# Patient Record
Sex: FEMALE | Race: WHITE | NOT HISPANIC OR LATINO | Employment: STUDENT | URBAN - METROPOLITAN AREA
[De-identification: names, ages, dates, MRNs, and addresses within clinical notes are randomized per-mention and may not be internally consistent; named-entity substitution may affect disease eponyms.]

---

## 2017-11-29 ENCOUNTER — HOSPITAL ENCOUNTER (EMERGENCY)
Facility: CLINIC | Age: 27
Discharge: HOME | End: 2017-11-29
Attending: EMERGENCY MEDICINE | Admitting: EMERGENCY MEDICINE
Payer: COMMERCIAL

## 2017-11-29 VITALS
TEMPERATURE: 99.7 F | SYSTOLIC BLOOD PRESSURE: 120 MMHG | RESPIRATION RATE: 16 BRPM | BODY MASS INDEX: 32.39 KG/M2 | OXYGEN SATURATION: 99 % | WEIGHT: 165 LBS | DIASTOLIC BLOOD PRESSURE: 74 MMHG | HEART RATE: 65 BPM | HEIGHT: 60 IN

## 2017-11-29 DIAGNOSIS — T78.40XA ALLERGIC REACTION, INITIAL ENCOUNTER: ICD-10-CM

## 2017-11-29 PROCEDURE — 25000125 ZZHC RX 250: Performed by: EMERGENCY MEDICINE

## 2017-11-29 PROCEDURE — 25000128 H RX IP 250 OP 636: Performed by: EMERGENCY MEDICINE

## 2017-11-29 PROCEDURE — 96375 TX/PRO/DX INJ NEW DRUG ADDON: CPT

## 2017-11-29 PROCEDURE — 99284 EMERGENCY DEPT VISIT MOD MDM: CPT | Mod: 25

## 2017-11-29 PROCEDURE — 96374 THER/PROPH/DIAG INJ IV PUSH: CPT

## 2017-11-29 RX ORDER — DIPHENHYDRAMINE HYDROCHLORIDE 50 MG/ML
25 INJECTION INTRAMUSCULAR; INTRAVENOUS ONCE
Status: COMPLETED | OUTPATIENT
Start: 2017-11-29 | End: 2017-11-29

## 2017-11-29 RX ORDER — PREDNISONE 20 MG/1
40 TABLET ORAL ONCE
Status: COMPLETED | OUTPATIENT
Start: 2017-11-29 | End: 2017-11-29

## 2017-11-29 RX ADMIN — PREDNISONE 40 MG: 20 TABLET ORAL at 15:25

## 2017-11-29 RX ADMIN — DIPHENHYDRAMINE HYDROCHLORIDE 25 MG: 50 INJECTION, SOLUTION INTRAMUSCULAR; INTRAVENOUS at 15:22

## 2017-11-29 ASSESSMENT — ENCOUNTER SYMPTOMS
DIARRHEA: 0
CHEST TIGHTNESS: 1
VOMITING: 0
FACIAL SWELLING: 1
ABDOMINAL PAIN: 0
FEVER: 0
TROUBLE SWALLOWING: 1

## 2017-11-29 NOTE — ED AVS SNAPSHOT
[facility] Emergency Department    [address]    [facility] [address]    Phone:  [phone]    Fax:  [phone]                                       Jacoby Vasquez   MRN: [id number]    Department:  [facility] Emergency Department   Date of Visit:  11/29/2017           After Visit Summary Signature Page     I have received my discharge instructions, and my questions have been answered. I have discussed any challenges I see with this plan with the nurse or doctor.    ..........................................................................................................................................  Patient/Patient Representative Signature      ..........................................................................................................................................  Patient Representative Print Name and Relationship to Patient    ..................................................               ................................................  Date                                            Time    ..........................................................................................................................................  Reviewed by Signature/Title    ...................................................              ..............................................  Date                                                            Time

## 2017-11-29 NOTE — ED AVS SNAPSHOT
[facility] Emergency Department    [address]    [facility] [address]    Phone:  [phone]    Fax:  [phone]                                       Jacoby Vasquez   MRN: [id number]    Department:  [facility] Emergency Department   Date of Visit:  11/29/2017           Patient Information     Date Of Birth          1990        Your diagnoses for this visit were:     Allergic reaction, initial encounter        You were seen by Bryce Meier MD.      Follow-up Information     Follow up with [facility]. Schedule an appointment as soon as possible for a visit in 3 days.    Specialty:  Family Medicine    Why:  For follow up or see your own primary care physician.    Contact information:    [address] [facility] [address] [phone]        Discharge Instructions       Discharge Instructions  Allergic Reaction    An allergic reaction can result in a rash, itching, swelling, watery eyes, or a runny nose. A serious reaction can cause swelling of your mouth or throat, or difficulty breathing (wheezing). The most serious allergy is called anaphylaxis, and can be life-threatening. Many allergies result in hives, also called urticaria.       An allergy happens when the body s natural defense system (immune system) overreacts to something. The thing that triggers your allergic reaction is called an allergen. The first time you are exposed to your allergen, you may not have any reaction, but the body makes a protein called an antibody. The antibody lets the body recognize and remember the allergen.  Every time you are exposed to your allergen you get more antibody and your reaction can be more severe.      Generally, every Emergency Department visit should have a follow-up clinic visit with either a primary or a specialty clinic/provider. Please follow-up as instructed by your emergency provider today.    Call 911 if you have:    Swelling  of the lips, tongue or throat.    Hoarse voice, drooling or trouble breathing.    Chest pain or shortness of breath.    Fainting or unconsciousness.    What can I do to help myself?    If you know what caused your allergy, do not touch it, throw any of it away, and tell others not to have it around you. Wear a medical alert bracelet with a name of your allergen on it.    If you do not know what you are allergic to, keep a journal of everything that you are exposed to (foods, soaps, medicines, etc.). Take this with you when you follow up with your primary provider or specialist (Allergist). This may help determine what is causing the allergic reaction.    Take any medicines that are prescribed.    Antihistamines can decrease rash or itching. You may use Benadryl  (diphenhydramine) for rash or itching according to package directions, or use a prescription antihistamine as recommended by your provider.    For significant allergic reactions, you may have been given a prescription for an epinephrine (adrenaline) auto injector. Carry this with you at all times! Use it if you are having any symptoms of anaphylaxis.  Do not be afraid to use it. Return to the Emergency Department if you use your auto injector, call 911 if it does not resolve the symptoms. It is only meant to buy time until you can get to the Emergency Department!  If you were given a prescription for medicine here today, be sure to read all of the information (including the package insert) that comes with your prescription.  This will include important information about the medicine, its side effects, and any warnings that you need to know about.  The pharmacist who fills the prescription can provide more information and answer questions you may have about the medicine.  If you have questions or concerns that the pharmacist cannot address, please call or return to the Emergency Department.   Remember that you can always come back to the Emergency Department  if you are not able to see your regular provider in the amount of time listed above, if you get any new symptoms, or if there is anything that worries you.      24 Hour Appointment Hotline       To make an appointment at any [facility], call [phone] ([phone]). If you don't have a family doctor or clinic, we will help you find one. [facility] clinics are conveniently located to serve the needs of you and your family.             Review of your medicines      Notice     You have not been prescribed any medications.            Orders Needing Specimen Collection     None      Pending Results     No orders found from 11/27/2017 to 11/30/2017.            Pending Culture Results     No orders found from 11/27/2017 to 11/30/2017.            Pending Results Instructions     If you had any lab results that were not finalized at the time of your Discharge, you can call the ED Lab Result RN at [phone]. You will be contacted by this team for any positive Lab results or changes in treatment. The nurses are available 7 days a week from 10A to 6:30P.  You can leave a message 24 hours per day and they will return your call.        Test Results From Your Hospital Stay               Clinical Quality Measure: Blood Pressure Screening     Your blood pressure was checked while you were in the emergency department today. The last reading we obtained was  BP: 90/51 . Please read the guidelines below about what these numbers mean and what you should do about them.  If your systolic blood pressure (the top number) is less than 120 and your diastolic blood pressure (the bottom number) is less than 80, then your blood pressure is normal. There is nothing more that you need to do about it.  If your systolic blood pressure (the top number) is 120-139 or your diastolic blood pressure (the bottom number) is 80-89, your blood pressure may be higher than it should be. You should have your blood pressure rechecked within a year  "by a primary care provider.  If your systolic blood pressure (the top number) is 140 or greater or your diastolic blood pressure (the bottom number) is 90 or greater, you may have high blood pressure. High blood pressure is treatable, but if left untreated over time it can put you at risk for heart attack, stroke, or kidney failure. You should have your blood pressure rechecked by a primary care provider within the next 4 weeks.  If your provider in the emergency department today gave you specific instructions to follow-up with your doctor or provider even sooner than that, you should follow that instruction and not wait for up to 4 weeks for your follow-up visit.        Thank you for choosing [facility]       Thank you for choosing [facility] for your care. Our goal is always to provide you with excellent care. Hearing back from our patients is one way we can continue to improve our services. Please take a few minutes to complete the written survey that you may receive in the mail after you visit with us. Thank you!        [employer] Information     [employer] lets you send messages to your doctor, view your test results, renew your prescriptions, schedule appointments and more. To sign up, go to [email or web address] . Click on \"Log in\" on the left side of the screen, which will take you to the Welcome page. Then click on \"Sign up Now\" on the right side of the page.     You will be asked to enter the access code listed below, as well as some personal information. Please follow the directions to create your username and password.     Your access code is: [email or web address]  Expires: 2018  5:21 PM     Your access code will  in 90 days. If you need help or a new code, please call your [facility] clinic or [phone].        Care EveryWhere ID     This is your Care EveryWhere ID. This could be used by other organizations to access your [facility] medical records  [id number]        Equal Access to Services     MARGUERITE MOORE" AH: Ivania Ritchie, wadenisse lukamadaha, qasabrinata kaopal storey. So [facility] [phone].    ATENCIÓN: Si habla español, tiene a garnett disposición servicios gratuitos de asistencia lingüística. Llame al [phone].    We comply with applicable federal civil rights laws and [place] laws. We do not discriminate on the basis of race, color, national origin, age, disability, sex, sexual orientation, or gender identity.            After Visit Summary       This is your record. Keep this with you and show to your community pharmacist(s) and doctor(s) at your next visit.

## 2017-11-29 NOTE — ED NOTES
Patient reports hives, chest heaviness, difficulty swallowing, nasal congestion all started about 20 minutes ago after leaving a restaurant..

## 2017-11-29 NOTE — ED NOTES
"Patient states feeling better. No rashes noted. Patient requesting discharge now that she's been \"watched for 2 hours\". MD advised.  "

## 2017-11-29 NOTE — DISCHARGE INSTRUCTIONS
Discharge Instructions  Allergic Reaction    An allergic reaction can result in a rash, itching, swelling, watery eyes, or a runny nose. A serious reaction can cause swelling of your mouth or throat, or difficulty breathing (wheezing). The most serious allergy is called anaphylaxis, and can be life-threatening. Many allergies result in hives, also called urticaria.       An allergy happens when the body s natural defense system (immune system) overreacts to something. The thing that triggers your allergic reaction is called an allergen. The first time you are exposed to your allergen, you may not have any reaction, but the body makes a protein called an antibody. The antibody lets the body recognize and remember the allergen.  Every time you are exposed to your allergen you get more antibody and your reaction can be more severe.      Generally, every Emergency Department visit should have a follow-up clinic visit with either a primary or a specialty clinic/provider. Please follow-up as instructed by your emergency provider today.    Call 911 if you have:    Swelling of the lips, tongue or throat.    Hoarse voice, drooling or trouble breathing.    Chest pain or shortness of breath.    Fainting or unconsciousness.    What can I do to help myself?    If you know what caused your allergy, do not touch it, throw any of it away, and tell others not to have it around you. Wear a medical alert bracelet with a name of your allergen on it.    If you do not know what you are allergic to, keep a journal of everything that you are exposed to (foods, soaps, medicines, etc.). Take this with you when you follow up with your primary provider or specialist (Allergist). This may help determine what is causing the allergic reaction.    Take any medicines that are prescribed.    Antihistamines can decrease rash or itching. You may use Benadryl  (diphenhydramine) for rash or itching according to package directions, or use a prescription  antihistamine as recommended by your provider.    For significant allergic reactions, you may have been given a prescription for an epinephrine (adrenaline) auto injector. Carry this with you at all times! Use it if you are having any symptoms of anaphylaxis.  Do not be afraid to use it. Return to the Emergency Department if you use your auto injector, call 911 if it does not resolve the symptoms. It is only meant to buy time until you can get to the Emergency Department!  If you were given a prescription for medicine here today, be sure to read all of the information (including the package insert) that comes with your prescription.  This will include important information about the medicine, its side effects, and any warnings that you need to know about.  The pharmacist who fills the prescription can provide more information and answer questions you may have about the medicine.  If you have questions or concerns that the pharmacist cannot address, please call or return to the Emergency Department.   Remember that you can always come back to the Emergency Department if you are not able to see your regular provider in the amount of time listed above, if you get any new symptoms, or if there is anything that worries you.

## 2017-11-29 NOTE — ED PROVIDER NOTES
History     Chief Complaint:  Allergic Reaction      HPI   Jacoby Vasquez is a 26 year old female who presents to the emergency department today for evaluation of a possible allergic reaction. The patient reports that she had sudden onset congestion, itching, red skin, shortness of breath and difficulty swallowing when she walked outside from work. She reports a deepening in her voice and a heavy sensation in her chest. She denies any previous allergic reactions or similar reactions. The patient also reports that her nose is mildly swollen and the left side of her face is itchy. The patient denies any new exposures including chemicals or fumes. She has no known environmental allergies in the past and has never had a reaction like this before. She denies any vomiting, diarrhea, abdominal pain, tongue swelling or recent fevers.    Allergies:  No Known Drug Allergies    Medications:    Medications reviewed. No current medications.     Past Medical History:    Medical history reviewed. No pertinent medical history.    Past Surgical History:    Cholecystectomy   GYN Surgery     Family History:    Family history reviewed. No pertinent family history.     Social History:  The patient was accompanied to the ED by her friend.    Review of Systems   Constitutional: Negative for fever.   HENT: Positive for congestion, facial swelling and trouble swallowing.    Respiratory: Positive for chest tightness.    Gastrointestinal: Negative for abdominal pain, diarrhea and vomiting.   Skin: Positive for rash (hives).   All other systems reviewed and are negative.    Physical Exam     Patient Vitals for the past 24 hrs:   BP Temp Temp src Pulse Heart Rate Resp SpO2 Height Weight   11/29/17 1615 90/51 - - - - - 99 % - -   11/29/17 1600 102/68 - - - - - 99 % - -   11/29/17 1545 102/70 - - - - - 99 % - -   11/29/17 1530 117/73 - - - - - 100 % - -   11/29/17 1518 116/82 - - - - - 99 % - -   11/29/17 1515 - 99.7  F (37.6  C) Temporal - - -  100 % - -   11/29/17 1511 (!) 113/92 - Oral 65 65 18 98 % 1.524 m (5') 74.8 kg (165 lb)     Physical Exam  General: Well appearing, nontoxic.  Resting comfortably  Head:  Scalp, face, and head appear normal  Eyes:  Pupils are equal, round, and reactive to light    No nystagmus    Conjunctivae non-injected and sclerae white  ENT:    The external nose is mildly edematous. + clear rhinorrhea and nasal congestion.    Pinnae are normal. Bilateral TMs clear without erythema, bulging, or effusion. Auditory canals normal.     The oropharynx is normal, mucous membranes moist    Posterior pharynx clear without swelling, exudates or erythema. No tongue or lip swelling. No other facial swelling. Voice normal. Tolerating secretions.    Uvula is in the midline  Neck:  Normal range of motion. No stridor.    There is no rigidity noted    Trachea is in the midline  CV:  Regular rate and rhythm     Normal S1/S2, no S3/S4    No murmur or rub  Resp:  Lungs are clear and equal bilaterally    There is no tachypnea    No increased work of breathing    No rales, wheezing, or rhonchi  GI:  Abdomen is soft, no rigidity or guarding    No distension, or mass    No tenderness   MS:  Normal muscular tone    Symmetric motor strength    No lower extremity edema  Skin:  Mild patchy blanching erythema most prominent over the anterior and posterior torso but no true urticaria present.  Neuro:  Awake and alert    Speech is normal and fluent    Moves all extremities spontaneously  Psych: Normal affect.  Appropriate interactions.      Emergency Department Course     Interventions:  1522: Benadryl 25 mg IV  1522: Zantac 50 mg IV  1525: Prednisone 40 mg PO     Emergency Department Course:  Nursing notes and vitals reviewed.  1512: I performed an exam of the patient as documented above.   1600: Patient rechecked and updated.  Patient was improved.  Findings and plan explained to the Patient. Patient discharged home with instructions regarding supportive  care, medications, and reasons to return. The importance of close follow-up was reviewed.    Impression & Plan      Medical Decision Making:  Jacoby Vasquez is a 26 year old female who presents for evaluation of an allergic reaction.  Signs and symptoms are consistent with sudden histamine release. Does not meet diagnostic criteria for anaphylaxis. Unknown exposure. No airway compromise, oropharyngeal swelling, respiratory compromise, wheezing, or hypotension. Benadryl, H2 blocker, prednisone given. Symptoms improved in the ED. She looks well at discharge and symptoms have stabilized and improved.  We did watch here for 2 hours prior to considering discharge.  She was treated here with medications as noted above.  Return of allergic symptoms were discussed with patient.  Given the rapidity of resolution, lack of serious systemic symptoms, lack of respiratory difficulty and no oral or pharyngeal swelling, would not admit at this time for anaphylaxis. There are no signs of anaphylactic shock.  Return precautions were discussed with patient. The patient's questions were answered and the patient was agreeable with discharge.    Critical Care time:  none    Diagnosis:    ICD-10-CM    1. Allergic reaction, initial encounter T78.40XA      Disposition:  discharged to home  Scribe Disclosure:  TREE, Carmen Leigh, am serving as a scribe at 3:17 PM on 11/29/2017 to document services personally performed by Bryce Meier MD based on my observations and the provider's statements to me.    11/29/2017   [facility] EMERGENCY DEPARTMENT       Bryce Meier MD  11/29/17 8986

## 2017-11-29 NOTE — ED NOTES
Pt comes in after walking outside and all of a sudden getting generalized hives, nasal congestion and difficulty swallowing. No known allergies and has never had anything like this before.

## 2019-01-16 ENCOUNTER — VIRTUAL VISIT (OUTPATIENT)
Dept: FAMILY MEDICINE | Facility: OTHER | Age: 29
End: 2019-01-16

## 2019-01-17 NOTE — PROGRESS NOTES
"Date:   Clinician: Minerva Chester  Clinician NPI: [id number]  Patient: Jacoby Vasquez  Patient : 1990  Patient Address: [address] [place] [address] [place] [id number]  Patient Phone: [phone]  Visit Protocol: URI  Patient Summary:  Jacoby is a 28 year old ( : 1990 ) female who initiated a Visit for cold, sinus infection, or influenza. When asked the question \"Please sign me up to receive news, health information and promotions from [employer].\", Jacoby responded \"Yes\".    Jacoby states her symptoms started today.   Her symptoms consist of myalgia, enlarged lymph nodes, a headache, a sore throat, malaise, and a cough. She is experiencing mild difficulty breathing with activities but can speak normally in full sentences. Jacoby also feels feverish.   Symptom details     Cough: Jacoby coughs every 5-10 minutes and her cough is more bothersome at night. Phlegm comes into her throat when she coughs. She does not believe the phlegm causes the cough. The color of the phlegm is green.     Sore throat: Jacoby reports having severe throat pain (7-9 on a 10 point pain scale), does not have exudate on her tonsils, and can swallow liquids. The lymph nodes in her neck are enlarged. A rash has not appeared on the skin since the sore throat started.     Temperature: Her current temperature is 99 degrees Fahrenheit.     Headache: She states the headache is moderate (4-6 on a 10 point pain scale).      Jacoby denies having ear pain, facial pain or pressure, chills, rhinitis, wheezing, teeth pain, and nasal congestion. She also denies taking antibiotic medication for the symptoms and having recent facial or sinus surgery in the past 60 days.   Jacoby is not sure if she has been exposed to someone with strep throat. She has not recently been exposed to someone with influenza. Jacoby has been in close contact with the following high risk individuals: children under the age of 5.   Jacoby had 1 sinus " infection within the past year.   Weight: 170 lbs   Jacoby smokes or uses smokeless tobacco.   She denies pregnancy and denies breastfeeding. She has menstruated in the past month.   MEDICATIONS: No current medications, ALLERGIES: NKDA  Clinician Response:  Dear Jacoby,  I am sorry you are not feeling well. To determine the most appropriate care for you, I would like you to be seen in person to further discuss your health history and symptoms.  You will not be charged for this Visit. Thank you for trusting us with your care.   Diagnosis: Refer for additional evaluation  Diagnosis ICD: R69  Diagnosis ICD: 462.0

## 2024-06-20 ENCOUNTER — HOSPITAL ENCOUNTER (EMERGENCY)
Facility: CLINIC | Age: 34
Discharge: OTHER INSTITUTION | End: 2024-06-23
Attending: EMERGENCY MEDICINE | Admitting: EMERGENCY MEDICINE
Payer: COMMERCIAL

## 2024-06-20 DIAGNOSIS — F19.10 POLYSUBSTANCE ABUSE (H): ICD-10-CM

## 2024-06-20 PROBLEM — F32.9 MAJOR DEPRESSION: Status: ACTIVE | Noted: 2024-06-20

## 2024-06-20 PROBLEM — F41.9 ANXIETY: Status: ACTIVE | Noted: 2024-06-20

## 2024-06-20 LAB
ANION GAP SERPL CALCULATED.3IONS-SCNC: 16 MMOL/L (ref 7–15)
BASOPHILS # BLD AUTO: 0.1 10E3/UL (ref 0–0.2)
BASOPHILS NFR BLD AUTO: 1 %
BUN SERPL-MCNC: 15.2 MG/DL (ref 6–20)
CALCIUM SERPL-MCNC: 9.2 MG/DL (ref 8.6–10)
CHLORIDE SERPL-SCNC: 103 MMOL/L (ref 98–107)
CREAT SERPL-MCNC: 0.67 MG/DL (ref 0.51–0.95)
DEPRECATED HCO3 PLAS-SCNC: 21 MMOL/L (ref 22–29)
EGFRCR SERPLBLD CKD-EPI 2021: >90 ML/MIN/1.73M2
EOSINOPHIL # BLD AUTO: 0.9 10E3/UL (ref 0–0.7)
EOSINOPHIL NFR BLD AUTO: 9 %
ERYTHROCYTE [DISTWIDTH] IN BLOOD BY AUTOMATED COUNT: 14.6 % (ref 10–15)
ETHANOL SERPL-MCNC: <0.01 G/DL
GLUCOSE SERPL-MCNC: 85 MG/DL (ref 70–99)
HCT VFR BLD AUTO: 43.5 % (ref 35–47)
HGB BLD-MCNC: 14.5 G/DL (ref 11.7–15.7)
IMM GRANULOCYTES # BLD: 0 10E3/UL
IMM GRANULOCYTES NFR BLD: 0 %
LYMPHOCYTES # BLD AUTO: 4.3 10E3/UL (ref 0.8–5.3)
LYMPHOCYTES NFR BLD AUTO: 43 %
MCH RBC QN AUTO: 27.4 PG (ref 26.5–33)
MCHC RBC AUTO-ENTMCNC: 33.3 G/DL (ref 31.5–36.5)
MCV RBC AUTO: 82 FL (ref 78–100)
MONOCYTES # BLD AUTO: 0.7 10E3/UL (ref 0–1.3)
MONOCYTES NFR BLD AUTO: 7 %
NEUTROPHILS # BLD AUTO: 3.9 10E3/UL (ref 1.6–8.3)
NEUTROPHILS NFR BLD AUTO: 40 %
NRBC # BLD AUTO: 0 10E3/UL
NRBC BLD AUTO-RTO: 0 /100
PLATELET # BLD AUTO: 461 10E3/UL (ref 150–450)
POTASSIUM SERPL-SCNC: 3.5 MMOL/L (ref 3.4–5.3)
RBC # BLD AUTO: 5.3 10E6/UL (ref 3.8–5.2)
SODIUM SERPL-SCNC: 140 MMOL/L (ref 135–145)
WBC # BLD AUTO: 9.9 10E3/UL (ref 4–11)

## 2024-06-20 PROCEDURE — 80048 BASIC METABOLIC PNL TOTAL CA: CPT | Performed by: EMERGENCY MEDICINE

## 2024-06-20 PROCEDURE — 36415 COLL VENOUS BLD VENIPUNCTURE: CPT | Performed by: EMERGENCY MEDICINE

## 2024-06-20 PROCEDURE — 85004 AUTOMATED DIFF WBC COUNT: CPT | Performed by: EMERGENCY MEDICINE

## 2024-06-20 PROCEDURE — 82077 ASSAY SPEC XCP UR&BREATH IA: CPT | Performed by: EMERGENCY MEDICINE

## 2024-06-20 PROCEDURE — 258N000003 HC RX IP 258 OP 636: Mod: JZ | Performed by: EMERGENCY MEDICINE

## 2024-06-20 PROCEDURE — 99285 EMERGENCY DEPT VISIT HI MDM: CPT | Mod: 25

## 2024-06-20 PROCEDURE — 250N000011 HC RX IP 250 OP 636: Mod: JZ | Performed by: EMERGENCY MEDICINE

## 2024-06-20 PROCEDURE — 96361 HYDRATE IV INFUSION ADD-ON: CPT

## 2024-06-20 PROCEDURE — 96375 TX/PRO/DX INJ NEW DRUG ADDON: CPT

## 2024-06-20 PROCEDURE — 96374 THER/PROPH/DIAG INJ IV PUSH: CPT

## 2024-06-20 RX ORDER — ONDANSETRON 2 MG/ML
4 INJECTION INTRAMUSCULAR; INTRAVENOUS EVERY 30 MIN PRN
Status: DISCONTINUED | OUTPATIENT
Start: 2024-06-20 | End: 2024-06-23 | Stop reason: HOSPADM

## 2024-06-20 RX ORDER — DEXTROAMPHETAMINE SACCHARATE, AMPHETAMINE ASPARTATE MONOHYDRATE, DEXTROAMPHETAMINE SULFATE AND AMPHETAMINE SULFATE 2.5; 2.5; 2.5; 2.5 MG/1; MG/1; MG/1; MG/1
20 CAPSULE, EXTENDED RELEASE ORAL DAILY PRN
COMMUNITY
Start: 2024-04-02

## 2024-06-20 RX ORDER — DIVALPROEX SODIUM 500 MG/1
500 TABLET, DELAYED RELEASE ORAL
COMMUNITY
End: 2024-06-21

## 2024-06-20 RX ORDER — VENLAFAXINE HYDROCHLORIDE 150 MG/1
150 CAPSULE, EXTENDED RELEASE ORAL DAILY
COMMUNITY
Start: 2023-07-24 | End: 2024-07-23

## 2024-06-20 RX ORDER — DEXTROAMPHETAMINE SACCHARATE, AMPHETAMINE ASPARTATE, DEXTROAMPHETAMINE SULFATE AND AMPHETAMINE SULFATE 2.5; 2.5; 2.5; 2.5 MG/1; MG/1; MG/1; MG/1
10 TABLET ORAL DAILY PRN
COMMUNITY
Start: 2024-04-15

## 2024-06-20 RX ORDER — ALBUTEROL SULFATE 0.83 MG/ML
2.5 SOLUTION RESPIRATORY (INHALATION) EVERY 6 HOURS PRN
COMMUNITY
Start: 2024-03-10

## 2024-06-20 RX ORDER — DIVALPROEX SODIUM 250 MG/1
TABLET, DELAYED RELEASE ORAL
COMMUNITY
Start: 2023-12-26

## 2024-06-20 RX ORDER — HYDROXYZINE HYDROCHLORIDE 25 MG/1
25-50 TABLET, FILM COATED ORAL DAILY PRN
COMMUNITY
Start: 2023-05-04

## 2024-06-20 RX ORDER — LORAZEPAM 2 MG/ML
1 INJECTION INTRAMUSCULAR ONCE
Status: COMPLETED | OUTPATIENT
Start: 2024-06-20 | End: 2024-06-20

## 2024-06-20 RX ORDER — ALBUTEROL SULFATE 90 UG/1
1-2 AEROSOL, METERED RESPIRATORY (INHALATION) EVERY 6 HOURS PRN
COMMUNITY
Start: 2023-05-04

## 2024-06-20 RX ADMIN — LORAZEPAM 1 MG: 2 INJECTION INTRAMUSCULAR; INTRAVENOUS at 22:40

## 2024-06-20 RX ADMIN — SODIUM CHLORIDE 1000 ML: 9 INJECTION, SOLUTION INTRAVENOUS at 22:40

## 2024-06-20 RX ADMIN — ONDANSETRON 4 MG: 2 INJECTION INTRAMUSCULAR; INTRAVENOUS at 22:40

## 2024-06-20 ASSESSMENT — ACTIVITIES OF DAILY LIVING (ADL)
ADLS_ACUITY_SCORE: 33
ADLS_ACUITY_SCORE: 35

## 2024-06-20 ASSESSMENT — COLUMBIA-SUICIDE SEVERITY RATING SCALE - C-SSRS
1. IN THE PAST MONTH, HAVE YOU WISHED YOU WERE DEAD OR WISHED YOU COULD GO TO SLEEP AND NOT WAKE UP?: YES
2. HAVE YOU ACTUALLY HAD ANY THOUGHTS OF KILLING YOURSELF IN THE PAST MONTH?: YES
3. HAVE YOU BEEN THINKING ABOUT HOW YOU MIGHT KILL YOURSELF?: NO
5. HAVE YOU STARTED TO WORK OUT OR WORKED OUT THE DETAILS OF HOW TO KILL YOURSELF? DO YOU INTEND TO CARRY OUT THIS PLAN?: NO
6. HAVE YOU EVER DONE ANYTHING, STARTED TO DO ANYTHING, OR PREPARED TO DO ANYTHING TO END YOUR LIFE?: NO
4. HAVE YOU HAD THESE THOUGHTS AND HAD SOME INTENTION OF ACTING ON THEM?: YES

## 2024-06-21 ENCOUNTER — TELEPHONE (OUTPATIENT)
Dept: BEHAVIORAL HEALTH | Facility: CLINIC | Age: 34
End: 2024-06-21
Payer: COMMERCIAL

## 2024-06-21 LAB
AMPHETAMINES UR QL SCN: ABNORMAL
BARBITURATES UR QL SCN: ABNORMAL
BENZODIAZ UR QL SCN: ABNORMAL
BZE UR QL SCN: ABNORMAL
CANNABINOIDS UR QL SCN: ABNORMAL
FENTANYL UR QL: ABNORMAL
HCG UR QL: NEGATIVE
HOLD SPECIMEN: NORMAL
HOLD SPECIMEN: NORMAL
OPIATES UR QL SCN: ABNORMAL
PCP QUAL URINE (ROCHE): ABNORMAL
SARS-COV-2 RNA RESP QL NAA+PROBE: NEGATIVE

## 2024-06-21 PROCEDURE — 80307 DRUG TEST PRSMV CHEM ANLYZR: CPT | Performed by: EMERGENCY MEDICINE

## 2024-06-21 PROCEDURE — 250N000013 HC RX MED GY IP 250 OP 250 PS 637: Performed by: EMERGENCY MEDICINE

## 2024-06-21 PROCEDURE — 87635 SARS-COV-2 COVID-19 AMP PRB: CPT | Performed by: EMERGENCY MEDICINE

## 2024-06-21 PROCEDURE — 81025 URINE PREGNANCY TEST: CPT | Performed by: EMERGENCY MEDICINE

## 2024-06-21 RX ORDER — LORAZEPAM 1 MG/1
1 TABLET ORAL ONCE
Status: COMPLETED | OUTPATIENT
Start: 2024-06-21 | End: 2024-06-21

## 2024-06-21 RX ORDER — HYDROXYZINE HYDROCHLORIDE 25 MG/1
25 TABLET, FILM COATED ORAL EVERY 4 HOURS PRN
Status: DISCONTINUED | OUTPATIENT
Start: 2024-06-21 | End: 2024-06-23 | Stop reason: HOSPADM

## 2024-06-21 RX ORDER — ACETAMINOPHEN 325 MG/1
975 TABLET ORAL ONCE
Status: COMPLETED | OUTPATIENT
Start: 2024-06-21 | End: 2024-06-21

## 2024-06-21 RX ORDER — SULFAMETHOXAZOLE/TRIMETHOPRIM 800-160 MG
1 TABLET ORAL 2 TIMES DAILY
COMMUNITY

## 2024-06-21 RX ORDER — PREDNISONE 20 MG/1
40 TABLET ORAL DAILY
COMMUNITY

## 2024-06-21 RX ORDER — AMOXICILLIN 875 MG
875 TABLET ORAL 2 TIMES DAILY
COMMUNITY

## 2024-06-21 RX ORDER — HYDROXYZINE HYDROCHLORIDE 25 MG/1
25 TABLET, FILM COATED ORAL ONCE
Status: COMPLETED | OUTPATIENT
Start: 2024-06-21 | End: 2024-06-21

## 2024-06-21 RX ADMIN — HYDROXYZINE HYDROCHLORIDE 25 MG: 25 TABLET ORAL at 21:58

## 2024-06-21 RX ADMIN — ACETAMINOPHEN 975 MG: 325 TABLET, FILM COATED ORAL at 16:50

## 2024-06-21 RX ADMIN — LORAZEPAM 1 MG: 1 TABLET ORAL at 14:36

## 2024-06-21 RX ADMIN — HYDROXYZINE HYDROCHLORIDE 25 MG: 25 TABLET ORAL at 09:40

## 2024-06-21 ASSESSMENT — COLUMBIA-SUICIDE SEVERITY RATING SCALE - C-SSRS
5. HAVE YOU STARTED TO WORK OUT OR WORKED OUT THE DETAILS OF HOW TO KILL YOURSELF? DO YOU INTEND TO CARRY OUT THIS PLAN?: NO
ATTEMPT SINCE LAST CONTACT: NO
1. SINCE LAST CONTACT, HAVE YOU WISHED YOU WERE DEAD OR WISHED YOU COULD GO TO SLEEP AND NOT WAKE UP?: YES
6. HAVE YOU EVER DONE ANYTHING, STARTED TO DO ANYTHING, OR PREPARED TO DO ANYTHING TO END YOUR LIFE?: NO
REASONS FOR IDEATION SINCE LAST CONTACT: DOES NOT APPLY
SUICIDE, SINCE LAST CONTACT: NO
TOTAL  NUMBER OF ABORTED OR SELF INTERRUPTED ATTEMPTS SINCE LAST CONTACT: NO
2. HAVE YOU ACTUALLY HAD ANY THOUGHTS OF KILLING YOURSELF?: YES
TOTAL  NUMBER OF INTERRUPTED ATTEMPTS SINCE LAST CONTACT: NO

## 2024-06-21 NOTE — ED NOTES
Pt searched by security. Allowed to stay in own clothing (minimal). Pt's sister gave security drugs that pt had on self. Security disposed of them.

## 2024-06-21 NOTE — ED TRIAGE NOTES
Pt. Presents to ED with complaints of using cocaine for the last 4 days to numb the pain of her father passing and recent marital conflicts. Pt. Reports she is not safe to be alone but does not have a specific plan on how to end her life. Pt. Denies hx of SI behavior. AVSS on RA. A & O x 4, independent. Denies any other drug or alcohol use.

## 2024-06-21 NOTE — ED PROVIDER NOTES
"  Emergency Department Note      History of Present Illness     Chief Complaint  Mental Health Problem    HPI  Jacoby Vasquez is a 33 year old female with a history of anxiety, depression, and cannabis use who presents to the emergency department for mental health problem. The patient states that for 4 days, she has been experiencing a mental health problem and notes that her \"life has fallen apart within the last 4 months\" after a conflict with her spouse. She reports that 4 days ago, she bought some cocaine and has been consistently using this cocaine since purchase. She states that she is currently experiencing diaphoresis, chills, a headache, and is restless. She is concerned for dehydration as she has not drank any fluids today but did eat lunch and dinner. Denies any overdoses, other ingestions, or attempts to harm herself. Denies suicidal ideations or homicidal ideations. Denies chest pain or shortness of breath. Denies visual or auditory hallucinations. She adds that she smokes marijuana. She notes that tonight, her children are staying at her sisters house.    The patient's cousin states that the patient has been frequently using cocaine and notes that the patient has a \"hole in her nose from cocaine use.\" She is concerned the patient may have been using ketamine as well as IV drugs as she found \"2 canisters\" of what she suspected to be ketamine as well as some alcohol wipes and rubber bands in the patient's belongings. She adds that the patient has a hx of anxiety, depression, and borderline personality disorder in which she has been seen for this in the past.    Independent Historian  Cousin as detailed above.    Past Medical History   Medical History and Problem List  Sepsis  Depression  Cannabis use    Insomnia  Anxiety    Medications  albuterol (PROAIR HFA/PROVENTIL HFA/VENTOLIN HFA) 108 (90 Base) MCG/ACT inhaler  amphetamine-dextroamphetamine (ADDERALL) 10 MG tablet  hydrOXYzine HCl (ATARAX) 25 MG " tablet  venlafaxine (EFFEXOR XR) 150 MG 24 hr capsule  divalproex sodium delayed-release (DEPAKOTE) 500 MG DR tablet    Surgical History   Cholecystectomy  D&C  Tonsillectomy and adenoidectomy  Hernia repair  [place] tooth extraction    Physical Exam   Patient Vitals for the past 24 hrs:   BP Temp Temp src Pulse Resp SpO2   06/20/24 2120 (!) 146/84 98.6  F (37  C) Oral 94 18 99 %     Physical Exam  General: Patient is alert, awake and interactive when I enter the room  Head: The scalp, face, and head appear normal  Eyes: Conjunctivae are normal  ENT: The nose is normal, Pinnae are normal, External acoustic canals are normal  Neck: Trachea midline  CV: Pulses are normal.   Resp: No respiratory distress   Musc: Normal muscular tone, moving all extremities.  Skin: No rash or lesions noted  Neuro:  Speech is normal and fluent. Face is symmetric.   Psych: labile, flight of ideas. Pressured speech. Frequent movement     Diagnostics   Lab Results   Labs Ordered and Resulted from Time of ED Arrival to Time of ED Departure   BASIC METABOLIC PANEL - Abnormal       Result Value    Sodium 140      Potassium 3.5      Chloride 103      Carbon Dioxide (CO2) 21 (*)     Anion Gap 16 (*)     Urea Nitrogen 15.2      Creatinine 0.67      GFR Estimate >90      Calcium 9.2      Glucose 85     CBC WITH PLATELETS AND DIFFERENTIAL - Abnormal    WBC Count 9.9      RBC Count 5.30 (*)     Hemoglobin 14.5      Hematocrit 43.5      MCV 82      MCH 27.4      MCHC 33.3      RDW 14.6      Platelet Count 461 (*)     % Neutrophils 40      % Lymphocytes 43      % Monocytes 7      % Eosinophils 9      % Basophils 1      % Immature Granulocytes 0      NRBCs per 100 WBC 0      Absolute Neutrophils 3.9      Absolute Lymphocytes 4.3      Absolute Monocytes 0.7      Absolute Eosinophils 0.9 (*)     Absolute Basophils 0.1      Absolute Immature Granulocytes 0.0      Absolute NRBCs 0.0     ETHYL ALCOHOL LEVEL - Normal    Alcohol ethyl <0.01     HCG QUALITATIVE  URINE       ED Course    Medications Administered  Medications   ondansetron (ZOFRAN) injection 4 mg (4 mg Intravenous $Given 6/20/24 2240)   sodium chloride 0.9% BOLUS 1,000 mL (1,000 mLs Intravenous $New Bag 6/20/24 2240)   LORazepam (ATIVAN) injection 1 mg (1 mg Intravenous $Given 6/20/24 2240)     Procedures  Procedures     Discussion of Management  DEC     Social Determinants of Health adding to complexity of care  Stress/Adjustment Disorders    ED Course  ED Course as of 06/21/24 0325   Thu Jun 20, 2024 2205 I obtained history and examined the patient as noted above.      2259 I spoke with DEC, regarding the patient.       Medical Decision Making / Diagnosis   CMS Diagnoses: None    MIPS  None    MDM  Jacoby Vasquez is a 33 year old female with past medical history of anxiety, depression and polysubstance abuse who presents to the emergency department with anxiety, increasing life stressors and polysubstance abuse.  Family reports that they are concerned that she has been using cocaine, ketamine and possible IV drugs.  Patient admits to cocaine only.  On physical exam she appears to be under the influence of a sympathomimetic drug.  Possible ongoing cocaine use versus methamphetamine etc.  Patient was treated with fluids and IV Ativan.  Patient and family had some concern about the patient's overall mental health and wellbeing.  She was evaluated by DEC.  They feel that she is still under significant influence of drugs and they were unable to perform full examination.  Will plan on her sobering overnight and will reassess tomorrow.  Patient's final disposition will be based on reassessment in the morning.    Disposition  Signed out pending reassessment     ICD-10 Codes:    ICD-10-CM    1. Polysubstance abuse (H)  F19.10            Scribe Disclosure:  Nomi LEAVITT, am serving as a scribe at 10:27 PM on 6/20/2024 to document services personally performed by Erwin Kevin MD based on  my observations and the provider's statements to me.        Erwin Kevin MD  06/21/24 0325
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
## 2024-06-21 NOTE — ED NOTES
RN ED Mental Health Handoff Note    Voluntary    Does patient require 1:1? No    Hold and rights been given and documented for patient: No - Voluntary    Is the patient in BH scrubs? No -Prefers own clothing    Has the patient been searched? Yes - prior to this shift    Is the 15 minute observation tool up to date? Yes    Was patient issued a welcome folder? No     Room check completed this shift: Yes    PSS3 and [place] Assessment/Reassessment this shift:    C-SSRS ([place])      Date and Time Q1 Wished to be Dead (Past Month) Q2 Suicidal Thoughts (Past Month) Q3 Suicidal Thought Method Q4 Suicidal Intent without Specific Plan Q5 Suicide Intent with Specific Plan Q6 Suicide Behavior (Lifetime) If yes to Q6, within past 3 months? Level of Risk per Screen Level of Risk per Screen User   06/21/24 0553 1-->yes 1-->yes 0-->no 0-->no 0-->no 1-->yes -- -- low risk MRM   06/20/24 2324 1-->yes 1-->yes 0-->no 0-->no 0-->no 1-->yes -- -- low risk BAQ   06/20/24 2122 1-->yes 1-->yes 0-->no 1-->yes 0-->no 0-->no -- -- high risk HLM            Behavioral status of patient: Green    Code 21 called this shift? No    Use of restraints/seclusion this shift? No    Most recent vital signs:  Temp: 98.6  F (37  C) Temp src: Oral BP: (!) 139/118 Pulse: 98   Resp: 18 SpO2: 99 % O2 Device: None (Room air)      Medications:  Scheduled medication compliance? N/A no scheduled medications    PRN Meds administered this shift? Yes    Medications   ondansetron (ZOFRAN) injection 4 mg (4 mg Intravenous $Given 6/20/24 2240)   sodium chloride 0.9% BOLUS 1,000 mL (0 mLs Intravenous Stopped 6/21/24 0249)   LORazepam (ATIVAN) injection 1 mg (1 mg Intravenous $Given 6/20/24 2240)   hydrOXYzine HCl (ATARAX) tablet 25 mg (25 mg Oral $Given 6/21/24 8740)   LORazepam (ATIVAN) tablet 1 mg (1 mg Oral $Given 6/21/24 9369)         ADLs    Meal Provided this shift? Yes    Hygiene items provided? Yes    ADLs completed? Yes    Date of last shower: Unknown      Any significant events this shift? No    Any information that would be helpful in caring for this patient? N/A    Family present/updated? Yes - Sister, with pt's permission    Location of patient's belongings: With pt    Critical Care Minutes:  Does the patient need critical care minutes documented? No

## 2024-06-21 NOTE — ED NOTES
IP MH Referral Acuity Rating Score (RARS)    LMHP complete at referral to IP MH, with DEC; and, daily while awaiting IP MH placement. Call score to PPS.  CRITERIA SCORING   New 72 HH and Involuntary for IP MH (not adolescent) 0/1   Boarding over 24 hours 1/1   Vulnerable adult at least 55+ with multiple co morbidities; or, Patient age 11 or under 0/1   Suicide ideation without relief of precipitating factors 1/1   Current plan for suicide 0/1   Current plan for homicide 0/1   Imminent risk or actual attempt to seriously harm another without relief of factors precipitating the attempt 0/1   Severe dysfunction in daily living (ex: complete neglect for self care, extreme disruption in vegetative function, extreme deterioration in social interactions) 1/1   Recent (last 2 weeks) or current physical aggression in the ED 0/1   Restraints or seclusion episode in ED 0/1   Verbal aggression, agitation, yelling, etc., while in the ED 0/1   Active psychosis with psychomotor agitation or catatonia 0/1   Need for constant or near constant redirection (from leaving, from others, etc).  0/1   Intrusive or disruptive behaviors 0/1   TOTAL Acuity Total Score: 3

## 2024-06-21 NOTE — PLAN OF CARE
"Jacoby Vasquez  June 20, 2024  Plan of Care Hand-off Note     Patient Care Path: observation    Plan for Care:   After therapeutic assessment, intervention and aftercare planning by ED care team and [facility] and in consultation with attending provider, the patient's circumstances and mental state were appropriate for observation overnight with a reassessment for possible discharge versus inpatient. Patient is currently still under the influence of substances, patient is very anxious wanting inpatient psychiatric care but would benefit from further evaluation and observation to determine what is psychiatric versus substance. She denies SI/SIB/SA/HI and denies plans, means, or intent to harm herself or others. Patient will remain in the emergency department overnight and be reassessed tomorrow after patient detoxes from substances, and get sleep.    Identified Goals and Safety Issues:  Patient is currently requesting inpatient psychiatric care for \"72 hours\", it is unclear if patient needs inpatient care.  Patient would benefit from psychiatry appointment and follow-up along with medication evaluation.  Patient is not expressing any safety concerns at this time. Plan is to have patient to remain in the emergency department and be reassessed for a plan of action once patient is no longer under the influence and has had some rest.     Overview:  tenisha lemus (Mother)  [phone] Jesi Mcdaniel  [phone]   (sister          Legal Status: Legal Status at Admission: Voluntary/Patient has signed consent for treatment    Psychiatry Consult: NA       Updated Dr. Kevin  regarding plan of care.         Milagros Lopez, SRIDEVI, LICSW, HP  DEC - Triage & Transition Services  Callback: [phone]         "

## 2024-06-21 NOTE — TELEPHONE ENCOUNTER
[place]: [place]  Access Inpatient Bed Call Log 6/21/2024  @ 9:15 AM   Intake has called facilities that have not updated their bed status within the last 12 hours.       [facility] is posting 0 beds.               [facility] is posting 3 beds. Main [phone] /APS line [phone]   [facility] is posting 0 beds. [phone]               [facility] is posting 0 bed. [phone];   9:22 AM Per Nell 1 or 2 step down beds.  [facility] is posting 0 beds. [phone]  [facility] (These are Young Adult beds, 18-35yr) is posting 5 beds. Negative COVID test required, no recent or significant aggression, violence, or sexual assault. [phone];     [facility] is posting 0 beds. [phone]          [facility] is posting 0 beds. [phone]             Pt remains on work list pending appropriate bed availability.

## 2024-06-21 NOTE — ED NOTES
RN ED Mental Health Handoff Note    SUGAR    Does patient require 1:1? No    Hold and rights been given and documented for patient: Yes    Is the patient in BH scrubs? No -pt allowed to stay in own clothing (minimal)    Has the patient been searched? Yes    Is the 15 minute observation tool up to date? Yes    Was patient issued a welcome folder? Yes    Room check completed this shift: Yes    PSS3 and [place] Assessment/Reassessment this shift:    C-SSRS ([place])      Date and Time Q1 Wished to be Dead (Past Month) Q2 Suicidal Thoughts (Past Month) Q3 Suicidal Thought Method Q4 Suicidal Intent without Specific Plan Q5 Suicide Intent with Specific Plan Q6 Suicide Behavior (Lifetime) If yes to Q6, within past 3 months? Level of Risk per Screen Level of Risk per Screen User   06/20/24 2324 1-->yes 1-->yes 0-->no 0-->no 0-->no 1-->yes -- -- low risk BAQ   06/20/24 2122 1-->yes 1-->yes 0-->no 1-->yes 0-->no 0-->no -- -- high risk HLM            Behavioral status of patient: Green    Code 21 called this shift? No    Use of restraints/seclusion this shift? No    Most recent vital signs:  Temp: 98.6  F (37  C) Temp src: Oral BP: (!) 146/84 Pulse: 94   Resp: 18 SpO2: 99 % O2 Device: None (Room air)      Medications:  Scheduled medication compliance? Yes    PRN Meds administered this shift? Yes    Medications   ondansetron (ZOFRAN) injection 4 mg (4 mg Intravenous $Given 6/20/24 2240)   sodium chloride 0.9% BOLUS 1,000 mL (0 mLs Intravenous Stopped 6/21/24 8268)   LORazepam (ATIVAN) injection 1 mg (1 mg Intravenous $Given 6/20/24 2240)         ADLs    Meal Provided this shift? Yes    Hygiene items provided? Yes    ADLs completed? Yes    Date of last shower: PTA    Any significant events this shift? No    Any information that would be helpful in caring for this patient?  N/a    Family present/updated? Yes    Location of patient's belongings: DEC office    Critical Care Minutes:  Does the patient need critical care minutes  documented? No

## 2024-06-21 NOTE — ED NOTES
RN updated family that pt will be reassessed tomorrow when pt is more sober. At this time, DEC  does not think pt is sober enough for assessment, RN and MD agree. Family updated and agreeable to pt staying in the hospital.
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
## 2024-06-21 NOTE — PHARMACY-ADMISSION MEDICATION HISTORY
"Pharmacist Admission Medication History    Admission medication history is complete. The information provided in this note is only as accurate as the sources available at the time of the update.    Information Source(s): Patient via in-person.    Pertinent Information: Confirmed Depakote dose x2. Has two Adderall orders, XR 20mg/day and IR 10mg/day - takes both PRN. Prednisone 40mg/day x4 days prescribed 6/16/24 - has not started, has not picked up this prescription. Abx: Amox and Bactrim prescribed 6/14/24 x7 days, took for two days and stopped as face cellulitis felt better and patient does not like the way abx make her feel. Last time took meds at home was \"couple days ago\".    Changes made to PTA medication list:  Added: prednisone, amox, bactrim - recent prescriptions, does NOT take all of these, but still added to med list.  Deleted: depakote (duplicate order, takes 250mg at bedtime - confirmed with patient x2).  Changed: albuterol, adderall, hydroxyzine.    Medication History Completed By: [facility] 6/21/2024 9:50 AM  Prior to Admission medications    Medication Sig Last Dose Taking? Auth Provider Long Term End Date   albuterol (PROAIR HFA/PROVENTIL HFA/VENTOLIN HFA) 108 (90 Base) MCG/ACT inhaler Inhale 1-2 puffs into the lungs every 6 hours as needed for shortness of breath or wheezing prn Yes Reported, Patient Yes    albuterol (PROVENTIL) (2.5 MG/3ML) 0.083% neb solution Inhale 2.5 mg into the lungs every 6 hours as needed for shortness of breath prn Yes Reported, Patient Yes    amphetamine-dextroamphetamine (ADDERALL XR) 10 MG 24 hr capsule Take 20 mg by mouth daily as needed prn Yes Reported, Patient     amphetamine-dextroamphetamine (ADDERALL) 10 MG tablet Take 10 mg by mouth daily as needed prn Yes Reported, Patient     divalproex sodium delayed-release (DEPAKOTE) 250 MG DR tablet TAKE 1 TABLET(250 MG) BY MOUTH DAILY AT BEDTIME couple days ago Yes Reported, Patient Yes    hydrOXYzine " HCl (ATARAX) 25 MG tablet Take 25-50 mg by mouth daily as needed for other (allergies) prn Yes Reported, Patient     venlafaxine (EFFEXOR XR) 150 MG 24 hr capsule Take 150 mg by mouth daily couple days ago Yes Reported, Patient Yes 7/23/24   amoxicillin (AMOXIL) 875 MG tablet Take 875 mg by mouth 2 times daily X7 days, Rx written 6/14  Patient not taking: Reported on 6/21/2024 Not Taking  Unknown, Entered By History     predniSONE (DELTASONE) 20 MG tablet Take 40 mg by mouth daily X4 days, Rx written 6/16/24  Patient not taking: Reported on 6/21/2024 Not Taking at did NOT fill this Rx  Unknown, Entered By History     sulfamethoxazole-trimethoprim (BACTRIM DS) 800-160 MG tablet Take 1 tablet by mouth 2 times daily X7 days, Rx written 6/14/24  Patient not taking: Reported on 6/21/2024 Not Taking  Unknown, Entered By History

## 2024-06-21 NOTE — PROGRESS NOTES
CD Consult acknowledged.  Staff will attempt to see pt for CD Consult Monday 6/24/24.  If pt discharges prior to consult, they can call [facility] at [phone] to schedule an OP JM Assessment.      DIMTIRIOS Sanchez
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
## 2024-06-21 NOTE — ED NOTES
Spoke to pt. Sister 'Jesi' and gave her an update. Jesi's phone number is [phone] with any further updates.

## 2024-06-21 NOTE — ED NOTES
The pt's best friend arrived and the pt gave permission for her friend to visit.  They are talking in the room and the pt is crying.    Pt requesting medication for anxiety. MD aware and orders obtained.

## 2024-06-21 NOTE — ED NOTES
Pt. Asked to go the bathroom and asked for some feminine products. Gave her that, took vitals and let her know breakfast tray will be here in a couple hours. Pt. Asked if she needed more fluids and stated she will probably need more anxiety meds now that she is awake. She stated 'I am feeling better and happy to be here'.

## 2024-06-22 ENCOUNTER — TELEPHONE (OUTPATIENT)
Dept: BEHAVIORAL HEALTH | Facility: CLINIC | Age: 34
End: 2024-06-22
Payer: COMMERCIAL

## 2024-06-22 VITALS
HEIGHT: 60 IN | DIASTOLIC BLOOD PRESSURE: 82 MMHG | TEMPERATURE: 98.2 F | SYSTOLIC BLOOD PRESSURE: 117 MMHG | RESPIRATION RATE: 18 BRPM | BODY MASS INDEX: 30.43 KG/M2 | OXYGEN SATURATION: 99 % | HEART RATE: 92 BPM | WEIGHT: 155 LBS

## 2024-06-22 PROCEDURE — 250N000013 HC RX MED GY IP 250 OP 250 PS 637: Performed by: EMERGENCY MEDICINE

## 2024-06-22 PROCEDURE — 250N000013 HC RX MED GY IP 250 OP 250 PS 637: Performed by: PSYCHIATRY & NEUROLOGY

## 2024-06-22 RX ORDER — OLANZAPINE 2.5 MG/1
5 TABLET, FILM COATED ORAL 2 TIMES DAILY
Status: DISCONTINUED | OUTPATIENT
Start: 2024-06-22 | End: 2024-06-23 | Stop reason: HOSPADM

## 2024-06-22 RX ORDER — DIVALPROEX SODIUM 500 MG/1
500 TABLET, DELAYED RELEASE ORAL AT BEDTIME
Status: DISCONTINUED | OUTPATIENT
Start: 2024-06-22 | End: 2024-06-23 | Stop reason: HOSPADM

## 2024-06-22 RX ORDER — VENLAFAXINE HYDROCHLORIDE 75 MG/1
75 CAPSULE, EXTENDED RELEASE ORAL
Status: DISCONTINUED | OUTPATIENT
Start: 2024-06-22 | End: 2024-06-23 | Stop reason: HOSPADM

## 2024-06-22 RX ORDER — LORAZEPAM 1 MG/1
1 TABLET ORAL ONCE
Status: DISCONTINUED | OUTPATIENT
Start: 2024-06-22 | End: 2024-06-22

## 2024-06-22 RX ORDER — LORAZEPAM 1 MG/1
2 TABLET ORAL ONCE
Status: COMPLETED | OUTPATIENT
Start: 2024-06-22 | End: 2024-06-22

## 2024-06-22 RX ADMIN — HYDROXYZINE HYDROCHLORIDE 25 MG: 25 TABLET ORAL at 09:54

## 2024-06-22 RX ADMIN — OLANZAPINE 5 MG: 2.5 TABLET, FILM COATED ORAL at 19:49

## 2024-06-22 RX ADMIN — DIVALPROEX SODIUM 500 MG: 500 TABLET, DELAYED RELEASE ORAL at 23:08

## 2024-06-22 RX ADMIN — LORAZEPAM 2 MG: 1 TABLET ORAL at 00:27

## 2024-06-22 RX ADMIN — OLANZAPINE 5 MG: 2.5 TABLET, FILM COATED ORAL at 11:02

## 2024-06-22 RX ADMIN — VENLAFAXINE HYDROCHLORIDE 75 MG: 75 CAPSULE, EXTENDED RELEASE ORAL at 12:08

## 2024-06-22 ASSESSMENT — COLUMBIA-SUICIDE SEVERITY RATING SCALE - C-SSRS
REASONS FOR IDEATION SINCE LAST CONTACT: DOES NOT APPLY
1. SINCE LAST CONTACT, HAVE YOU WISHED YOU WERE DEAD OR WISHED YOU COULD GO TO SLEEP AND NOT WAKE UP?: YES
6. HAVE YOU EVER DONE ANYTHING, STARTED TO DO ANYTHING, OR PREPARED TO DO ANYTHING TO END YOUR LIFE?: NO
TOTAL  NUMBER OF INTERRUPTED ATTEMPTS SINCE LAST CONTACT: NO
5. HAVE YOU STARTED TO WORK OUT OR WORKED OUT THE DETAILS OF HOW TO KILL YOURSELF? DO YOU INTEND TO CARRY OUT THIS PLAN?: NO
ATTEMPT SINCE LAST CONTACT: NO
2. HAVE YOU ACTUALLY HAD ANY THOUGHTS OF KILLING YOURSELF?: NO
SUICIDE, SINCE LAST CONTACT: NO
TOTAL  NUMBER OF ABORTED OR SELF INTERRUPTED ATTEMPTS SINCE LAST CONTACT: NO
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
## 2024-06-22 NOTE — ED NOTES
Consulted with ED provider after patient stated that she felt like the atarax was not helpful. Patient was noted to be pacing in room. Writer asked for PRN ativan to be ordered by provider.

## 2024-06-22 NOTE — ED NOTES
RN ED Mental Health Handoff Note    Voluntary    Does patient require 1:1? No    Hold and rights been given and documented for patient: No    Is the patient in  scrubs? No     Has the patient been searched? Yes    Is the 15 minute observation tool up to date? Yes    Was patient issued a welcome folder? Yes    Room check completed this shift: Yes    PSS3 and [place] Assessment/Reassessment this shift:    C-SSRS ([place])      Date and Time Q1 Wished to be Dead (Past Month) Q2 Suicidal Thoughts (Past Month) Q3 Suicidal Thought Method Q4 Suicidal Intent without Specific Plan Q5 Suicide Intent with Specific Plan Q6 Suicide Behavior (Lifetime) If yes to Q6, within past 3 months? Level of Risk per Screen Level of Risk per Screen User   06/22/24 0700 1-->yes 1-->yes 0-->no 0-->no 0-->no 1-->yes -- -- low risk GAJ   06/21/24 0553 1-->yes 1-->yes 0-->no 0-->no 0-->no 1-->yes -- -- low risk [facility]   06/20/24 2324 1-->yes 1-->yes 0-->no 0-->no 0-->no 1-->yes -- -- low risk BAQ   06/20/24 2122 1-->yes 1-->yes 0-->no 1-->yes 0-->no 0-->no -- -- high risk HLM            Behavioral status of patient: Green    Code 21 called this shift? No    Use of restraints/seclusion this shift? No    Most recent vital signs:  Temp: 98.2  F (36.8  C) Temp src: Oral BP: 135/86 Pulse: 92   Resp: 20 SpO2: 94 % O2 Device: None (Room air)      Medications:  Scheduled medication compliance? Yes    PRN Meds administered this shift? No    Medications   ondansetron (ZOFRAN) injection 4 mg (4 mg Intravenous $Given 6/20/24 3370)   hydrOXYzine HCl (ATARAX) tablet 25 mg (25 mg Oral $Given 6/22/24 0933)   OLANZapine (zyPREXA) tablet 5 mg (5 mg Oral $Given 6/22/24 1102)   divalproex sodium delayed-release (DEPAKOTE) DR tablet 500 mg (has no administration in time range)   venlafaxine (EFFEXOR XR) 24 hr capsule 75 mg (75 mg Oral $Given 6/22/24 4890)   sodium chloride 0.9% BOLUS 1,000 mL (0 mLs Intravenous Stopped 6/21/24 3425)   LORazepam (ATIVAN) injection 1  mg (1 mg Intravenous $Given 6/20/24 2240)   hydrOXYzine HCl (ATARAX) tablet 25 mg (25 mg Oral $Given 6/21/24 0940)   LORazepam (ATIVAN) tablet 1 mg (1 mg Oral $Given 6/21/24 1436)   acetaminophen (TYLENOL) tablet 975 mg (975 mg Oral $Given 6/21/24 1650)   LORazepam (ATIVAN) tablet 2 mg (2 mg Oral $Given 6/22/24 0027)         ADLs    Meal Provided this shift? Yes    Hygiene items provided? Yes    ADLs completed? No    Date of last shower: PTA    Any significant events this shift? No     Any information that would be helpful in caring for this patient?  Awaiting updated plan for transfer.    Family present/updated? No    Location of patient's belongings: DEC    Critical Care Minutes:  Does the patient need critical care minutes documented? No

## 2024-06-22 NOTE — ED NOTES
RN ED Mental Health Handoff Note    Voluntary    Does patient require 1:1? No    Hold and rights been given and documented for patient: Yes    Is the patient in BH scrubs? No -Clothing from home    Has the patient been searched? Yes    Is the 15 minute observation tool up to date? Yes    Was patient issued a welcome folder? Yes    Room check completed this shift: Yes    PSS3 and [place] Assessment/Reassessment this shift:    C-SSRS ([place])      Date and Time Q1 Wished to be Dead (Past Month) Q2 Suicidal Thoughts (Past Month) Q3 Suicidal Thought Method Q4 Suicidal Intent without Specific Plan Q5 Suicide Intent with Specific Plan Q6 Suicide Behavior (Lifetime) If yes to Q6, within past 3 months? Level of Risk per Screen Level of Risk per Screen User   06/21/24 0553 1-->yes 1-->yes 0-->no 0-->no 0-->no 1-->yes -- -- low risk MRM   06/20/24 2324 1-->yes 1-->yes 0-->no 0-->no 0-->no 1-->yes -- -- low risk BAQ   06/20/24 2122 1-->yes 1-->yes 0-->no 1-->yes 0-->no 0-->no -- -- high risk HLM            Behavioral status of patient: Green    Code 21 called this shift? No    Use of restraints/seclusion this shift? No    Most recent vital signs:      BP: (!) 139/118 Pulse: 98   Resp: 18 SpO2: 99 % O2 Device: None (Room air)      Medications:  Scheduled medication compliance? Yes    PRN Meds administered this shift? Yes    Medications   ondansetron (ZOFRAN) injection 4 mg (4 mg Intravenous $Given 6/20/24 2240)   hydrOXYzine HCl (ATARAX) tablet 25 mg (25 mg Oral $Given 6/21/24 2158)   sodium chloride 0.9% BOLUS 1,000 mL (0 mLs Intravenous Stopped 6/21/24 4799)   LORazepam (ATIVAN) injection 1 mg (1 mg Intravenous $Given 6/20/24 2240)   hydrOXYzine HCl (ATARAX) tablet 25 mg (25 mg Oral $Given 6/21/24 0740)   LORazepam (ATIVAN) tablet 1 mg (1 mg Oral $Given 6/21/24 1436)   acetaminophen (TYLENOL) tablet 975 mg (975 mg Oral $Given 6/21/24 1650)   LORazepam (ATIVAN) tablet 2 mg (2 mg Oral $Given 6/22/24 0027)          ADLs    Meal Provided this shift? Yes    Hygiene items provided? Yes    ADLs completed? Yes    Date of last shower: Prior to arrival    Any significant events this shift? No    Any information that would be helpful in caring for this patient?  Patient's sister is frequently requesting updates on plan of care.     Family present/updated? Yes    Location of patient's belongings: DEC office    Critical Care Minutes:  Does the patient need critical care minutes documented? No

## 2024-06-22 NOTE — ED NOTES
Patient's sister stated she has concerns about the patient's plan of care. Sister stated that she feels as though her sister is not being truthful about her substance use. Sister requested to speak to DEC . Writer advised patient's sister that the plan of care is inpatient psych placement with a JM consult. She was agreeable with plan.

## 2024-06-22 NOTE — ED NOTES
RN ED Mental Health Handoff Note    SUGAR    Does patient require 1:1? No    Hold and rights been given and documented for patient: Yes    Is the patient in BH scrubs? No -pt request    Has the patient been searched? Yes    Is the 15 minute observation tool up to date? Yes    Was patient issued a welcome folder? Yes    Room check completed this shift: Yes    PSS3 and [place] Assessment/Reassessment this shift:    C-SSRS ([place])      Date and Time Q1 Wished to be Dead (Past Month) Q2 Suicidal Thoughts (Past Month) Q3 Suicidal Thought Method Q4 Suicidal Intent without Specific Plan Q5 Suicide Intent with Specific Plan Q6 Suicide Behavior (Lifetime) If yes to Q6, within past 3 months? Level of Risk per Screen Level of Risk per Screen User   06/22/24 0700 1-->yes 1-->yes 0-->no 0-->no 0-->no 1-->yes -- -- low risk GAJ   06/21/24 0553 1-->yes 1-->yes 0-->no 0-->no 0-->no 1-->yes -- -- low risk [facility]   06/20/24 2324 1-->yes 1-->yes 0-->no 0-->no 0-->no 1-->yes -- -- low risk BAQ   06/20/24 2122 1-->yes 1-->yes 0-->no 1-->yes 0-->no 0-->no -- -- high risk HLM            Behavioral status of patient: Green    Code 21 called this shift? No    Use of restraints/seclusion this shift? No    Most recent vital signs:  Temp: 97.8  F (36.6  C) Temp src: Oral BP: (!) 128/98 Pulse: 90   Resp: 18 SpO2: 99 % O2 Device: None (Room air)      Medications:  Scheduled medication compliance? Yes    PRN Meds administered this shift? Yes    Medications   ondansetron (ZOFRAN) injection 4 mg (4 mg Intravenous $Given 6/20/24 5900)   hydrOXYzine HCl (ATARAX) tablet 25 mg (25 mg Oral $Given 6/22/24 0954)   OLANZapine (zyPREXA) tablet 5 mg (has no administration in time range)   sodium chloride 0.9% BOLUS 1,000 mL (0 mLs Intravenous Stopped 6/21/24 3491)   LORazepam (ATIVAN) injection 1 mg (1 mg Intravenous $Given 6/20/24 5837)   hydrOXYzine HCl (ATARAX) tablet 25 mg (25 mg Oral $Given 6/21/24 5245)   LORazepam (ATIVAN) tablet 1 mg (1 mg Oral $Given  6/21/24 1436)   acetaminophen (TYLENOL) tablet 975 mg (975 mg Oral $Given 6/21/24 1650)   LORazepam (ATIVAN) tablet 2 mg (2 mg Oral $Given 6/22/24 0027)         ADLs    Meal Provided this shift? Yes    Hygiene items provided? Yes    ADLs completed? Yes    Date of last shower: unknown    Any significant events this shift? No    Any information that would be helpful in caring for this patient?  Pt milan easily    Family present/updated? Yes    Location of patient's belongings: in room cell phone pt has her period and wants her own cloths    Critical Care Minutes:  Does the patient need critical care minutes documented? No

## 2024-06-22 NOTE — ED NOTES
RN ED Mental Health Handoff Note    Voluntary    Does patient require 1:1? No    Hold and rights been given and documented for patient: Yes    Is the patient in BH scrubs? No -on her own clothes    Has the patient been searched? Yes    Is the 15 minute observation tool up to date? Yes    Was patient issued a welcome folder? Yes    Room check completed this shift: Yes    PSS3 and [place] Assessment/Reassessment this shift:    C-SSRS ([place])      Date and Time Q1 Wished to be Dead (Past Month) Q2 Suicidal Thoughts (Past Month) Q3 Suicidal Thought Method Q4 Suicidal Intent without Specific Plan Q5 Suicide Intent with Specific Plan Q6 Suicide Behavior (Lifetime) If yes to Q6, within past 3 months? Level of Risk per Screen Level of Risk per Screen User   06/22/24 0700 1-->yes 1-->yes 0-->no 0-->no 0-->no 1-->yes -- -- low risk GAJ   06/21/24 0553 1-->yes 1-->yes 0-->no 0-->no 0-->no 1-->yes -- -- low risk [facility]   06/20/24 2324 1-->yes 1-->yes 0-->no 0-->no 0-->no 1-->yes -- -- low risk BAQ   06/20/24 2122 1-->yes 1-->yes 0-->no 1-->yes 0-->no 0-->no -- -- high risk HLM            Behavioral status of patient: Green    Code 21 called this shift? No    Use of restraints/seclusion this shift? No    Most recent vital signs:      BP: (!) 139/118 Pulse: 98   Resp: 18 SpO2: 99 % O2 Device: None (Room air)      Medications:  Scheduled medication compliance? No    PRN Meds administered this shift? No    Medications   ondansetron (ZOFRAN) injection 4 mg (4 mg Intravenous $Given 6/20/24 2240)   hydrOXYzine HCl (ATARAX) tablet 25 mg (25 mg Oral $Given 6/21/24 2158)   sodium chloride 0.9% BOLUS 1,000 mL (0 mLs Intravenous Stopped 6/21/24 6325)   LORazepam (ATIVAN) injection 1 mg (1 mg Intravenous $Given 6/20/24 2240)   hydrOXYzine HCl (ATARAX) tablet 25 mg (25 mg Oral $Given 6/21/24 0929)   LORazepam (ATIVAN) tablet 1 mg (1 mg Oral $Given 6/21/24 1436)   acetaminophen (TYLENOL) tablet 975 mg (975 mg Oral $Given 6/21/24 1650)    LORazepam (ATIVAN) tablet 2 mg (2 mg Oral $Given 6/22/24 0027)         ADLs    Meal Provided this shift? No    Hygiene items provided? No    ADLs completed? No    Date of last shower: Prior to arrival    Any significant events this shift? No    Any information that would be helpful in caring for this patient?  Update sister on the POC    Family present/updated? No    Location of patient's belongings: DEC office    Critical Care Minutes:  Does the patient need critical care minutes documented? No

## 2024-06-22 NOTE — TELEPHONE ENCOUNTER
10:10 AM Per call to Juli at [facility] will review to fax clinical.  10:35 AM Fax sent to PC for review.  11:42 AM PPS received call from Juli patient accepted to [facility] by Dr. Suggs/RN report [phone] 11:55 AM-12 PM.  11:47 AM [facility] ED notified.  3:08 PM Per follow up call to [facility] ED patient is requesting discharge. PPS requested callback if pt will discharge or transfer for [facility].    R: [place]patient [place] Call Log 6/22/24 8:25 AM    Intake has called facilities that have not updated the bed status within the last 12 hours.  (METRO)                        [facility] is at capacity           [facility] is posting 0 beds. [phone] Per call @8:23am, at cap. Can try this afternoon.  [facility] is posting 2 beds. Negative covid required Per call @8:21am, low acuity only       [facility] is posting 0 beds. Neg covid. No high school/Jess-psych. [phone]   [place] is posting 0 beds. [phone]  [facility] is posting 0 beds. [phone]   [facility] is posting 4 beds. Negative covid. [phone] Per call @8:09am, no answer  [facility] is posting 0 beds [phone]    Pt remains on the work list pending appropriate bed availability.

## 2024-06-22 NOTE — TELEPHONE ENCOUNTER
R: [place]  Access Inpatient Bed Call Log  6/22/24 @ 1:00am   Intake has called facilities that have not updated their bed status within the last 12 hours.     *METRO:  [place] -- [facility]: @ capacity.  [facility]: POSTING 3 BEDS. 1 ICU bed available. Reporting no reviews overnight.  [place] -- [facility]: @ cap per website. Low acuity  [place] -- [facility]: @ cap per website. Low acuity only.  [place] -- [facility]: @ cap per website.  [facility]: @ cap per website.  [facility] beds: POSTING 5 BEDS. Ages 18-35, Voluntary only, NO aggression/physical/sexual assault, violence hx or drug abuse, or psychosis. Negative Covid.   [place] [facility]: [facility] per [facility].  [place] -- [facility]: @ cap per website.  [place] -- [facility]: @ cap per website. Do not review overnight.     Pt remains on waitlist pending appropriate placement availability

## 2024-06-22 NOTE — ED NOTES
1400: I received s/o from Dr. Spears. The patient has a history of passive SI, polysubstance abuse. Just before signout (just before EMS arrived to take her to [facility]), she expressed interest in going home. Will plan on DEC re-evaluation.    1849: D/W Zakiya from DEC post re-evaluation. She notes that the patient expressed interest in discharge. We will plan on DEC re-evaluation.    1943: D/W Zakiya from DEC again. The patient had what sounds like a misunderstanding about what inpatient mental health care is.  The patient had read that they would take her phone and shoelaces and did not want to be in the setting that was perceived as a nursing home.  Mental health noted that all inpatient facilities likely do similar things and that inpatient care is still beneficial. Patient is ok going to another inpatient unit, just not [facility].     Bird Brown, DO  06/22/24 1945       Bird Brown, DO  06/22/24 1951     Statement Selected

## 2024-06-22 NOTE — ED NOTES
RN ED Mental Health Handoff Note    Voluntary    Does patient require 1:1? No    Hold and rights been given and documented for patient: Yes    Is the patient in BH scrubs? No -REGULAR CLOTHING    Has the patient been searched? Yes    Is the 15 minute observation tool up to date? Yes    Was patient issued a welcome folder? Yes    Room check completed this shift: Yes    PSS3 and [place] Assessment/Reassessment this shift:    C-SSRS ([place])      Date and Time Q1 Wished to be Dead (Past Month) Q2 Suicidal Thoughts (Past Month) Q3 Suicidal Thought Method Q4 Suicidal Intent without Specific Plan Q5 Suicide Intent with Specific Plan Q6 Suicide Behavior (Lifetime) If yes to Q6, within past 3 months? Level of Risk per Screen Level of Risk per Screen User   06/22/24 0700 1-->yes 1-->yes 0-->no 0-->no 0-->no 1-->yes -- -- low risk GAJ   06/21/24 0553 1-->yes 1-->yes 0-->no 0-->no 0-->no 1-->yes -- -- low risk [facility]   06/20/24 2324 1-->yes 1-->yes 0-->no 0-->no 0-->no 1-->yes -- -- low risk BAQ   06/20/24 2122 1-->yes 1-->yes 0-->no 1-->yes 0-->no 0-->no -- -- high risk HLM            Behavioral status of patient: Green    Code 21 called this shift? No    Use of restraints/seclusion this shift? No    Most recent vital signs:  Temp: 98.2  F (36.8  C) Temp src: Oral BP: 135/86 Pulse: 92   Resp: 20 SpO2: 94 % O2 Device: None (Room air)      Medications:  Scheduled medication compliance? Yes    PRN Meds administered this shift? No    Medications   ondansetron (ZOFRAN) injection 4 mg (4 mg Intravenous $Given 6/20/24 0790)   hydrOXYzine HCl (ATARAX) tablet 25 mg (25 mg Oral $Given 6/22/24 0932)   OLANZapine (zyPREXA) tablet 5 mg (5 mg Oral $Given 6/22/24 1102)   divalproex sodium delayed-release (DEPAKOTE) DR tablet 500 mg (has no administration in time range)   venlafaxine (EFFEXOR XR) 24 hr capsule 75 mg (75 mg Oral $Given 6/22/24 5130)   sodium chloride 0.9% BOLUS 1,000 mL (0 mLs Intravenous Stopped 6/21/24 9802)   LORazepam  (ATIVAN) injection 1 mg (1 mg Intravenous $Given 6/20/24 2240)   hydrOXYzine HCl (ATARAX) tablet 25 mg (25 mg Oral $Given 6/21/24 0940)   LORazepam (ATIVAN) tablet 1 mg (1 mg Oral $Given 6/21/24 1436)   acetaminophen (TYLENOL) tablet 975 mg (975 mg Oral $Given 6/21/24 1650)   LORazepam (ATIVAN) tablet 2 mg (2 mg Oral $Given 6/22/24 0027)         ADLs    Meal Provided this shift? Yes    Hygiene items provided? Yes    ADLs completed? No    Date of last shower: UNSURE    Any significant events this shift? No    Any information that would be helpful in caring for this patient?  WAS GOING TO TRANSFER TO [facility] BUT CHANGED HER MIND AT LAST MINUTE AND WANTS REEVALUATION BY DEC, AT 1630    Family present/updated? No    Location of patient's belongings:     Critical Care Minutes:  Does the patient need critical care minutes documented? No

## 2024-06-23 NOTE — PROGRESS NOTES
"Triage & Transition Services, Extended Care     Therapy Progress Note    Patient: Jacoby goes by \"Jacoby,\" uses she/her pronouns  Date of Service: June 22, 2024  Site of Service: [facility] EMERGENCY DEPT                             ED04  Patient was seen yes  Mode of Assessment: Virtual: AmWell    Presentation Summary: Pt reports extreme anxiety and a history of polysubstance use. Pt has depression about her separation from her children and has demonstrated poor coping skills. Pt does not think she will be be able to handle her mental health at home.    Therapeutic Intervention(s) Provided: Engaged in safety planning, Identified and practiced coping skills.    Current Symptoms: anxious low self esteem, sadness, hoplessness, increased appetite    Mental Status Exam   Affect: Appropriate  Appearance: Appropriate  Attention Span/Concentration: Attentive  Eye Contact: Engaged    Fund of Knowledge: Appropriate   Language /Speech Content: Fluent  Language /Speech Volume: Normal  Language /Speech Rate/Productions: Normal  Recent Memory: Intact  Remote Memory: Intact  Mood: Anxious, Depressed  Orientation to Person: Yes   Orientation to Place: Yes  Orientation to Time of Day: Yes  Orientation to Date: No     Situation (Do they understand why they are here?): Yes  Psychomotor Behavior: Normal  Thought Content: Clear  Thought Form: Intact    Treatment Objective(s) Addressed: rapport building, safety planning, building self-esteem    Patient Response to Interventions: acceptance expressed, verbalizes understanding    Progress Towards Goals: Patient Reports Symptoms Are: ongoing  Patient Progress Toward Goals: is making progress  Next Step to Work Toward Discharge: symptom stabilization, patient ability to engage in safety planning  Symptom Stabilization Comment: Pt participated in reassessment. She expressed interested in voluntarily going to an in-patient mental health program and expressed concern about her " mental health if she was discharged.    Case Management:      Plan: inpatient mental health  yes provider Dr. Brown  yes    Clinical Substantiation: Pt presented to ED on 6/20/24 after using cocaine for 4 days. Pt has a history of Anxiety, Depression and ADHD. Pt has a history of polysubstance use and is currently in an outpatient chemical dependency program. Pt has never been to an in-patient mental health program but has a therapist. Pt was observed to be very depressed and labile on 6/20/24. Pt did not have an active suicide plan but did not feel safe to go home. Pt was placed on observation and the plan was changed to in-patient mental my on 6/21/24. Pt was prescribed a new medication and has gotten some sleep. Pt appears to have improved somewhat but still reports feeling anxious. Pt has had passive SI. No history of suicide attempts. No SIB/HI or AH/VH. The recommedation continues to be for in-patient mental health for treatment, symptom stabilization, medication management and safety.    Legal Status: Legal Status at Admission: Voluntary/Patient has signed consent for treatment    Session Status: Time session started: 0549  Time session ended: 0612  Session Duration (minutes): 23 minutes  Session Number: 2  Anticipated number of sessions or this episode of care: 4    Time Spent: 23 minutes    CPT Code: CPT Codes: 93899 - Psychotherapy (with patient) - 30 (16-37*) min    Diagnosis:   Patient Active Problem List   Diagnosis Code    Anxiety F41.9    Major depression F32.9       Primary Problem This Admission:   F32.9 Major Depression    [facility]   Licensed Mental Health Professional (LIVANHP), [facility]  [phone]

## 2024-06-23 NOTE — TELEPHONE ENCOUNTER
R: [place]  Access Inpatient Bed Call Log 6/22/24 4PM     Intake has called facilities that have not updated the bed status within the last 12 hours.                               [facility] is at capacity            [facility] is posting 3 beds. [phone] Per call @8:23am, at cap. Can try this afternoon. No change.    [facility] is posting 2 beds. Negative covid required Per call @8:21am, low acuity only        [facility] is posting 0 beds. Neg covid. No high school/Jess-psych. [phone]    [place] is posting 0 beds. [phone]   [facility] is posting 0 beds. [phone]    [facility] is posting 2 beds. Negative covid. [phone]    [facility] is posting 0 beds [phone]       [facility] is posting 6 beds. LOW acuity ONLY. Mixed unit 12+. Negative covid- [phone]   [facility] has 2 beds posted. No aggression. Negative Covid. Low acuity.   [facility] is posting 0 beds. Negative covid. [phone]    [facility] is posting 2 beds. Low acuity only. Neg covid.  [phone]    [facility] is posting 2 beds. Low acuity. No current aggression.     [facility] ([place]) is posting 1 bed available. Negative covid.  [phone].    Med psych bed   [facility] is posting 1 bed. Low acuity. 72 HH hold preferred. Negative covid required. [phone]    [facility] ([place] is posting 1 bed. Low acuity only. Neg covid.  [phone]    [facility] in [place] is posting 6 beds.  Negative covid required.   Vol only, No history of aggression, violence, or assault. No sexual offenders. No 72 HH holds. [phone]         Pt remains on the work list pending appropriate bed availability.      7:45 PM Intake received a call from maria de jesus LEDESMA, reporting the pt declined admission to  due to some confusion about the rules at  and due to the pt hearing negative  things about the facility. The pt is willing to have placement within the metro + 2 hours and may reconsider [facility] if placement will take several days.     8:08 PM Intake called [facility]. Per Bryce, they are able to review.     10:35 PM Intake called [facility]. Per Jessica, they are still reviewing the pt.     10:41 PM Intake received a call from pt's nurse reporting the pt is still willing to go to [facility]. Intake will follow up.    10:46 PM Intake called [facility]. Intake was sent to .